# Patient Record
Sex: MALE | Race: BLACK OR AFRICAN AMERICAN | NOT HISPANIC OR LATINO | Employment: FULL TIME | URBAN - METROPOLITAN AREA
[De-identification: names, ages, dates, MRNs, and addresses within clinical notes are randomized per-mention and may not be internally consistent; named-entity substitution may affect disease eponyms.]

---

## 2023-01-09 ENCOUNTER — OCCUPATIONAL HEALTH (OUTPATIENT)
Dept: URGENT CARE | Facility: CLINIC | Age: 18
End: 2023-01-09

## 2023-01-09 DIAGNOSIS — Z13.9 ENCOUNTER FOR SCREENING: Primary | ICD-10-CM

## 2023-01-09 DIAGNOSIS — Z02.1 PRE-EMPLOYMENT EXAMINATION: Primary | ICD-10-CM

## 2023-01-09 LAB — BREATH ALCOHOL: 0

## 2023-01-09 PROCEDURE — 72110 XR LUMBAR SPINE COMPLETE 5 VIEW: ICD-10-PCS | Mod: S$GLB,,, | Performed by: RADIOLOGY

## 2023-01-09 PROCEDURE — 80305 DRUG TEST PRSMV DIR OPT OBS: CPT | Mod: S$GLB,,, | Performed by: NURSE PRACTITIONER

## 2023-01-09 PROCEDURE — 82075 POCT BREATH ALCOHOL TEST: ICD-10-PCS | Mod: S$GLB,,, | Performed by: NURSE PRACTITIONER

## 2023-01-09 PROCEDURE — 99002 DEVICE HANDLING PHYS/QHP: CPT | Mod: S$GLB,,, | Performed by: NURSE PRACTITIONER

## 2023-01-09 PROCEDURE — 94010 PULMONARY FUNCTION SCREENING (OCC MED PHYSICALS): ICD-10-PCS | Mod: S$GLB,,, | Performed by: NURSE PRACTITIONER

## 2023-01-09 PROCEDURE — 99499 PHYSICAL, BASIC COMPLEXITY: ICD-10-PCS | Mod: S$GLB,,, | Performed by: NURSE PRACTITIONER

## 2023-01-09 PROCEDURE — 89240 COMPREHENSIVE METABOLIC PANEL: ICD-10-PCS | Mod: QW,S$GLB,, | Performed by: NURSE PRACTITIONER

## 2023-01-09 PROCEDURE — 71045 XR CHEST 1 VIEW: ICD-10-PCS | Mod: S$GLB,,, | Performed by: RADIOLOGY

## 2023-01-09 PROCEDURE — 97750 PHYSICAL PERFORMANCE TEST: CPT | Mod: S$GLB,,, | Performed by: NURSE PRACTITIONER

## 2023-01-09 PROCEDURE — 94010 BREATHING CAPACITY TEST: CPT | Mod: S$GLB,,, | Performed by: NURSE PRACTITIONER

## 2023-01-09 PROCEDURE — 72110 X-RAY EXAM L-2 SPINE 4/>VWS: CPT | Mod: S$GLB,,, | Performed by: RADIOLOGY

## 2023-01-09 PROCEDURE — 99080 OSHA QUESTIONNAIRE: ICD-10-PCS | Mod: S$GLB,,, | Performed by: NURSE PRACTITIONER

## 2023-01-09 PROCEDURE — 80305 OOH COLLECTION ONLY DRUG SCREEN: ICD-10-PCS | Mod: S$GLB,,, | Performed by: NURSE PRACTITIONER

## 2023-01-09 PROCEDURE — 71045 X-RAY EXAM CHEST 1 VIEW: CPT | Mod: S$GLB,,, | Performed by: RADIOLOGY

## 2023-01-09 PROCEDURE — 99499 UNLISTED E&M SERVICE: CPT | Mod: S$GLB,,, | Performed by: NURSE PRACTITIONER

## 2023-01-09 PROCEDURE — 99002 MASK FIT-QUANTITATIVE: ICD-10-PCS | Mod: S$GLB,,, | Performed by: NURSE PRACTITIONER

## 2023-01-09 PROCEDURE — 82075 ASSAY OF BREATH ETHANOL: CPT | Mod: S$GLB,,, | Performed by: NURSE PRACTITIONER

## 2023-01-09 PROCEDURE — 89240 UNLISTED MISC PATH TEST: CPT | Mod: QW,S$GLB,, | Performed by: NURSE PRACTITIONER

## 2023-01-09 PROCEDURE — 99080 SPECIAL REPORTS OR FORMS: CPT | Mod: S$GLB,,, | Performed by: NURSE PRACTITIONER

## 2023-01-09 PROCEDURE — 97750 LIFT TEST: ICD-10-PCS | Mod: S$GLB,,, | Performed by: NURSE PRACTITIONER

## 2023-01-11 ENCOUNTER — TELEPHONE (OUTPATIENT)
Dept: URGENT CARE | Facility: CLINIC | Age: 18
End: 2023-01-11

## 2023-01-11 ENCOUNTER — TELEPHONE (OUTPATIENT)
Dept: URGENT CARE | Facility: CLINIC | Age: 18
End: 2023-01-11
Payer: MEDICAID

## 2023-01-11 NOTE — TELEPHONE ENCOUNTER
Attempted to call pt with lab results. No answer. VM message left requesting he return call. PRINCESS